# Patient Record
Sex: MALE | Race: WHITE | Employment: UNEMPLOYED | URBAN - METROPOLITAN AREA
[De-identification: names, ages, dates, MRNs, and addresses within clinical notes are randomized per-mention and may not be internally consistent; named-entity substitution may affect disease eponyms.]

---

## 2020-09-07 ENCOUNTER — HOSPITAL ENCOUNTER (EMERGENCY)
Age: 35
Discharge: LEFT BEFORE DISCHARGE | End: 2020-09-08
Attending: EMERGENCY MEDICINE | Admitting: INTERNAL MEDICINE
Payer: COMMERCIAL

## 2020-09-07 VITALS
WEIGHT: 205.4 LBS | OXYGEN SATURATION: 96 % | SYSTOLIC BLOOD PRESSURE: 145 MMHG | HEIGHT: 70 IN | DIASTOLIC BLOOD PRESSURE: 82 MMHG | HEART RATE: 75 BPM | TEMPERATURE: 98.3 F | BODY MASS INDEX: 29.41 KG/M2 | RESPIRATION RATE: 16 BRPM

## 2020-09-07 LAB
BASOPHILS ABSOLUTE: 0 K/UL (ref 0–0.2)
BASOPHILS RELATIVE PERCENT: 0.7 %
EOSINOPHILS ABSOLUTE: 0.6 K/UL (ref 0–0.6)
EOSINOPHILS RELATIVE PERCENT: 8.2 %
HCT VFR BLD CALC: 39.4 % (ref 40.5–52.5)
HEMOGLOBIN: 13.3 G/DL (ref 13.5–17.5)
LYMPHOCYTES ABSOLUTE: 3.2 K/UL (ref 1–5.1)
LYMPHOCYTES RELATIVE PERCENT: 44.2 %
MCH RBC QN AUTO: 28.8 PG (ref 26–34)
MCHC RBC AUTO-ENTMCNC: 33.7 G/DL (ref 31–36)
MCV RBC AUTO: 85.5 FL (ref 80–100)
MONOCYTES ABSOLUTE: 0.8 K/UL (ref 0–1.3)
MONOCYTES RELATIVE PERCENT: 10.5 %
NEUTROPHILS ABSOLUTE: 2.6 K/UL (ref 1.7–7.7)
NEUTROPHILS RELATIVE PERCENT: 36.4 %
PDW BLD-RTO: 14.4 % (ref 12.4–15.4)
PLATELET # BLD: 183 K/UL (ref 135–450)
PMV BLD AUTO: 8.5 FL (ref 5–10.5)
RBC # BLD: 4.61 M/UL (ref 4.2–5.9)
WBC # BLD: 7.3 K/UL (ref 4–11)

## 2020-09-07 PROCEDURE — G0480 DRUG TEST DEF 1-7 CLASSES: HCPCS

## 2020-09-07 PROCEDURE — 80053 COMPREHEN METABOLIC PANEL: CPT

## 2020-09-07 PROCEDURE — 82550 ASSAY OF CK (CPK): CPT

## 2020-09-07 PROCEDURE — 6370000000 HC RX 637 (ALT 250 FOR IP): Performed by: EMERGENCY MEDICINE

## 2020-09-07 PROCEDURE — 99284 EMERGENCY DEPT VISIT MOD MDM: CPT

## 2020-09-07 PROCEDURE — 85025 COMPLETE CBC W/AUTO DIFF WBC: CPT

## 2020-09-07 RX ORDER — BUPRENORPHINE AND NALOXONE 2; .5 MG/1; MG/1
1 FILM, SOLUBLE BUCCAL; SUBLINGUAL DAILY
COMMUNITY

## 2020-09-07 RX ORDER — DIAZEPAM 5 MG/1
5 TABLET ORAL ONCE
Status: COMPLETED | OUTPATIENT
Start: 2020-09-07 | End: 2020-09-07

## 2020-09-07 RX ORDER — BUPRENORPHINE HYDROCHLORIDE AND NALOXONE HYDROCHLORIDE DIHYDRATE 8; 2 MG/1; MG/1
1 TABLET SUBLINGUAL DAILY
COMMUNITY

## 2020-09-07 RX ADMIN — DIAZEPAM 5 MG: 5 TABLET ORAL at 23:35

## 2020-09-07 ASSESSMENT — PAIN DESCRIPTION - DESCRIPTORS: DESCRIPTORS: SPASM

## 2020-09-07 ASSESSMENT — PAIN DESCRIPTION - PAIN TYPE: TYPE: ACUTE PAIN

## 2020-09-07 ASSESSMENT — PAIN DESCRIPTION - LOCATION: LOCATION: ARM

## 2020-09-07 ASSESSMENT — PAIN SCALES - GENERAL: PAINLEVEL_OUTOF10: 10

## 2020-09-07 ASSESSMENT — PAIN DESCRIPTION - FREQUENCY: FREQUENCY: CONTINUOUS

## 2020-09-07 ASSESSMENT — PAIN DESCRIPTION - ORIENTATION: ORIENTATION: RIGHT;LEFT

## 2020-09-08 ENCOUNTER — HOSPITAL ENCOUNTER (EMERGENCY)
Age: 35
Discharge: HOME | End: 2020-09-08
Attending: EMERGENCY MEDICINE
Payer: COMMERCIAL

## 2020-09-08 VITALS
DIASTOLIC BLOOD PRESSURE: 138 MMHG | SYSTOLIC BLOOD PRESSURE: 163 MMHG | OXYGEN SATURATION: 100 % | WEIGHT: 205 LBS | BODY MASS INDEX: 29.41 KG/M2 | TEMPERATURE: 99 F | RESPIRATION RATE: 16 BRPM | HEART RATE: 82 BPM

## 2020-09-08 PROBLEM — M62.82 RHABDOMYOLYSIS: Status: ACTIVE | Noted: 2020-09-08

## 2020-09-08 LAB
A/G RATIO: 1.6 (ref 1.1–2.2)
ACETAMINOPHEN LEVEL: <5 UG/ML (ref 10–30)
ALBUMIN SERPL-MCNC: 4.4 G/DL (ref 3.4–5)
ALP BLD-CCNC: 74 U/L (ref 40–129)
ALT SERPL-CCNC: 38 U/L (ref 10–40)
AMPHETAMINE SCREEN, URINE: POSITIVE
ANION GAP SERPL CALCULATED.3IONS-SCNC: 11 MMOL/L (ref 3–16)
AST SERPL-CCNC: 46 U/L (ref 15–37)
BARBITURATE SCREEN URINE: ABNORMAL
BENZODIAZEPINE SCREEN, URINE: ABNORMAL
BILIRUB SERPL-MCNC: 0.3 MG/DL (ref 0–1)
BILIRUBIN URINE: NEGATIVE
BLOOD, URINE: NEGATIVE
BUN BLDV-MCNC: 18 MG/DL (ref 7–20)
CALCIUM SERPL-MCNC: 9.2 MG/DL (ref 8.3–10.6)
CANNABINOID SCREEN URINE: ABNORMAL
CHLORIDE BLD-SCNC: 104 MMOL/L (ref 99–110)
CLARITY: CLEAR
CO2: 25 MMOL/L (ref 21–32)
COCAINE METABOLITE SCREEN URINE: ABNORMAL
COLOR: YELLOW
CREAT SERPL-MCNC: 0.6 MG/DL (ref 0.9–1.3)
ETHANOL: NORMAL MG/DL (ref 0–0.08)
GFR AFRICAN AMERICAN: >60
GFR NON-AFRICAN AMERICAN: >60
GLOBULIN: 2.7 G/DL
GLUCOSE BLD-MCNC: 103 MG/DL (ref 70–99)
GLUCOSE URINE: NEGATIVE MG/DL
KETONES, URINE: NEGATIVE MG/DL
LEUKOCYTE ESTERASE, URINE: NEGATIVE
Lab: ABNORMAL
METHADONE SCREEN, URINE: ABNORMAL
MICROSCOPIC EXAMINATION: NORMAL
NITRITE, URINE: NEGATIVE
OPIATE SCREEN URINE: ABNORMAL
OXYCODONE URINE: ABNORMAL
PH UA: 6
PH UA: 6 (ref 5–8)
PHENCYCLIDINE SCREEN URINE: ABNORMAL
POTASSIUM REFLEX MAGNESIUM: 4 MMOL/L (ref 3.5–5.1)
PROPOXYPHENE SCREEN: ABNORMAL
PROTEIN UA: NEGATIVE MG/DL
SALICYLATE, SERUM: 1.2 MG/DL (ref 15–30)
SODIUM BLD-SCNC: 140 MMOL/L (ref 136–145)
SPECIFIC GRAVITY UA: >=1.03 (ref 1–1.03)
TOTAL CK: 1169 U/L (ref 39–308)
TOTAL PROTEIN: 7.1 G/DL (ref 6.4–8.2)
URINE REFLEX TO CULTURE: NORMAL
URINE TYPE: NORMAL
UROBILINOGEN, URINE: 0.2 E.U./DL

## 2020-09-08 PROCEDURE — 96374 THER/PROPH/DIAG INJ IV PUSH: CPT

## 2020-09-08 PROCEDURE — 81003 URINALYSIS AUTO W/O SCOPE: CPT

## 2020-09-08 PROCEDURE — 6360000002 HC RX W HCPCS: Performed by: EMERGENCY MEDICINE

## 2020-09-08 PROCEDURE — G0378 HOSPITAL OBSERVATION PER HR: HCPCS

## 2020-09-08 PROCEDURE — 80307 DRUG TEST PRSMV CHEM ANLYZR: CPT

## 2020-09-08 PROCEDURE — 99283 EMERGENCY DEPT VISIT LOW MDM: CPT

## 2020-09-08 PROCEDURE — 2580000003 HC RX 258: Performed by: EMERGENCY MEDICINE

## 2020-09-08 RX ORDER — 0.9 % SODIUM CHLORIDE 0.9 %
2000 INTRAVENOUS SOLUTION INTRAVENOUS ONCE
Status: COMPLETED | OUTPATIENT
Start: 2020-09-08 | End: 2020-09-08

## 2020-09-08 RX ORDER — LORAZEPAM 2 MG/ML
1 INJECTION INTRAMUSCULAR ONCE
Status: DISCONTINUED | OUTPATIENT
Start: 2020-09-08 | End: 2020-09-08

## 2020-09-08 RX ORDER — LORAZEPAM 2 MG/ML
2 INJECTION INTRAMUSCULAR ONCE
Status: COMPLETED | OUTPATIENT
Start: 2020-09-08 | End: 2020-09-08

## 2020-09-08 RX ADMIN — LORAZEPAM 2 MG: 2 INJECTION INTRAMUSCULAR; INTRAVENOUS at 01:40

## 2020-09-08 RX ADMIN — SODIUM CHLORIDE 2000 ML: 9 INJECTION, SOLUTION INTRAVENOUS at 00:51

## 2020-09-08 ASSESSMENT — ENCOUNTER SYMPTOMS
BLOOD IN STOOL: 0
TROUBLE SWALLOWING: 0
BACK PAIN: 0
COLOR CHANGE: 0
ABDOMINAL PAIN: 0
PHOTOPHOBIA: 0
FACIAL SWELLING: 0
STRIDOR: 0
SHORTNESS OF BREATH: 0
VOICE CHANGE: 0
WHEEZING: 0
VOMITING: 0
NAUSEA: 0

## 2020-09-08 ASSESSMENT — PAIN DESCRIPTION - DESCRIPTORS: DESCRIPTORS: ACHING;CONSTANT;TINGLING

## 2020-09-08 ASSESSMENT — PAIN DESCRIPTION - LOCATION: LOCATION: HAND

## 2020-09-08 ASSESSMENT — PAIN DESCRIPTION - PAIN TYPE: TYPE: ACUTE PAIN

## 2020-09-08 ASSESSMENT — PAIN SCALES - GENERAL: PAINLEVEL_OUTOF10: 10

## 2020-09-08 NOTE — ED NOTES
Pt ambulatory to lobby in stable condition, calling for ride home.      Alex Jon, PAMELA  09/08/20 4750

## 2020-09-08 NOTE — ED NOTES
.Patient wishes to leave against medical advise. Physician aware. Patient informed that they are leaving against the advice of the attending physician. Informed of risks by physician. No signs and symptoms distress noted or voiced. Patient refused to sign the AMA form.        Judge Madelyn RN  09/08/20 5274

## 2020-09-08 NOTE — ED PROVIDER NOTES
[address]          ATTENDING PHYSICIAN NOTE       Date of evaluation: 9/8/2020    Chief Complaint     Hand Pain      History of Present Illness     Margretta Osgood is a 38 y.o. male who presents with complaint of pain in his bilateral forearms and hands. He says is been present for possibly up to a week, and attributes it to lifting heavy things at work including steel and concrete. Patient initially presented to an outside hospital this evening where he appeared quite agitated, with concern for the same pain, and there was recommendation be admitted directly to the hospital service here for moderately elevated CK and concern for rhabdomyolysis, but left AMA because he says that he did not like what they are doing, and the outside hospital made his pain worse. He presents now with the same pain. There was reportedly some mention that he had expressed some suicidal thoughts. The patient adamantly denies this to myself, the nursing staff, and the  present. He localizes pain to the forearms and into the hands both sides, describes a pins and needle sensation. Review of Systems     Review of Systems  Unable to obtain due to patient's agitation and lack of cooperation with the interview    Past Medical, Surgical, Family, and Social History     He has a past medical history of Hepatitis C and Heroin use. He has no past surgical history on file. His family history is not on file. He reports that he has been smoking cigarettes. He has been smoking about 1.00 pack per day. He has quit using smokeless tobacco. He reports current drug use. Drug: IV. He reports that he does not drink alcohol. Medications     Previous Medications    BUPRENORPHINE-NALOXONE (SUBOXONE) 2-0.5 MG FILM SL FILM    Place 1 Film under the tongue daily. BUPRENORPHINE-NALOXONE (SUBOXONE) 8-2 MG SUBL SL TABLET    Place 1 tablet under the tongue daily.        Allergies     He has limiting examination of the patient reveals that he is moving his arms and legs without difficulty, though he describes significant pain. However, as I was discussing his reason for coming to the hospital, as well as potential goals of treatment and reasons that he might of been admitted to the hospital here, the patient became more agitated, saying that we were profiling him as a drug user, and subsequently stood up and walked out of the emergency department, would not wait for discharge instructions or for any further evaluation. He is alert and oriented x3, appears to understand the consequence of leaving without workup and has capacity to make that decision and actually described that to me during our conversation. He does appear somewhat agitated likely as result of ongoing drug use, but again in my judgment and those of the staff observed him, appears to have capacity to make the decision to leave without treatment, workup, or further exam.  He denies adamantly suicidal or homicidal ideation. We did tell him to come back should he change his mind and desire further evaluation. Clinical Impression     1. Pain in both upper extremities        Disposition     PATIENT REFERRED TO:  No follow-up provider specified.     DISCHARGE MEDICATIONS:  New Prescriptions    No medications on file       DISPOSITION [place] 09/08/2020 06:39:13 AM          Kush Jama MD  09/08/20 0166

## 2020-09-08 NOTE — ED PROVIDER NOTES
[address] [facility]  eMERGENCY dEPARTMENT eNCOUnter      Pt Name: Akilah Alonso  MRN: [id number]  [place] 1985  Date of evaluation: 9/7/2020  Provider: Oswaldo Nieto MD    [address] [place] [address]       Chief Complaint   Patient presents with    Spasms     today, woke up pain and spams in tonia lower arms from elbows to finger tips states started new job 3 days ago,          HISTORY OF PRESENT ILLNESS   (Location/Symptom, Timing/Onset, Context/Setting, Quality, Duration, Modifying Factors, Severity)  Note limiting factors. Akilah Alonso is a 38 y.o. male with hx of heroin abuse currently in remission and hepatitis C who presents with severe spasming pains in his bilateral upper extremities. He reports that the pain started suddenly when he woke up and is that his bilateral arms from his elbows down to his fingertips. He reports that the pain is constant, spasming, and equal bilaterally. Denies any fever or infectious symptoms. He denies any trauma numbness or weakness. He does report that he started a new job recently and does do increased lifting but denies any falls or trauma. He reports his symptoms are severe, constant, and worsening. HPI    Nursing Notes were reviewed. REVIEW OFSYSTEMS    (2-9 systems for level 4, 10 or more for level 5)     Review of Systems   Constitutional: Negative for appetite change, fever and unexpected weight change. HENT: Negative for facial swelling, trouble swallowing and voice change. Eyes: Negative for photophobia and visual disturbance. Respiratory: Negative for shortness of breath, wheezing and stridor. Cardiovascular: Negative for chest pain and palpitations. Gastrointestinal: Negative for abdominal pain, blood in stool, nausea and vomiting. Genitourinary: Negative for difficulty urinating and dysuria. Musculoskeletal: Positive for arthralgias and myalgias. Negative for back pain, gait problem and neck pain.    Skin: Negative for color change and wound. Neurological: Negative for seizures, syncope and speech difficulty. Psychiatric/Behavioral: Negative for self-injury and suicidal ideas. Except as noted above the remainder of the review of systems was reviewed and negative. PAST MEDICAL HISTORY     Past Medical History:   Diagnosis Date    Hepatitis C     Heroin use          SURGICAL HISTORY     No past surgical history on file. CURRENT MEDICATIONS       Previous Medications    BUPRENORPHINE-NALOXONE (SUBOXONE) 2-0.5 MG FILM SL FILM    Place 1 Film under the tongue daily. BUPRENORPHINE-NALOXONE (SUBOXONE) 8-2 MG SUBL SL TABLET    Place 1 tablet under the tongue daily. ALLERGIES     Patient has no known allergies. FAMILY HISTORY     No family history on file.        SOCIAL HISTORY       Social History     Socioeconomic History    Marital status: Single     Spouse name: Not on file    Number of children: Not on file    Years of education: Not on file    Highest education level: Not on file   Occupational History    Not on file   Social Needs    Financial resource strain: Not on file    Food insecurity     Worry: Not on file     Inability: Not on file    Transportation needs     Medical: Not on file     Non-medical: Not on file   Tobacco Use    Smoking status: Current Every Day Smoker     Packs/day: 1.00     Types: Cigarettes    Smokeless tobacco: Former User   Substance and Sexual Activity    Alcohol use: No    Drug use: Yes     Types: IV     Comment: heroin     Sexual activity: Not on file   Lifestyle    Physical activity     Days per week: Not on file     Minutes per session: Not on file    Stress: Not on file   Relationships    Social connections     Talks on phone: Not on file     Gets together: Not on file     Attends Scientologist service: Not on file     Active member of club or organization: Not on file     Attends meetings of clubs or organizations: Not on file     Relationship status: Not on file    Intimate partner violence     Fear of current or ex partner: Not on file     Emotionally abused: Not on file     Physically abused: Not on file     Forced sexual activity: Not on file   Other Topics Concern    Not on file   Social History Narrative    Not on file         PHYSICAL EXAM    (up to 7 for level 4, 8 or more for level 5)     ED Triage Vitals [09/07/20 2306]   BP Temp Temp Source Pulse Resp SpO2 Height Weight   (!) 145/82 98.3 °F (36.8 °C) Oral 75 16 96 % 5' 10\" (1.778 m) 205 lb 6.4 oz (93.2 kg)       Physical Exam  Vitals signs and nursing note reviewed. Constitutional:       General: He is not in acute distress. Appearance: He is well-developed. HENT:      Head: Normocephalic and atraumatic. Right Ear: External ear normal.      Left Ear: External ear normal.   Eyes:      Conjunctiva/sclera: Conjunctivae normal.   Neck:      Musculoskeletal: Neck supple. Vascular: No JVD. Trachea: No tracheal deviation. Cardiovascular:      Rate and Rhythm: Normal rate. Pulses: Normal pulses. Comments: 2+ radial and ulnar pulses equal bilaterally. Normal cap refill to all fingers of bilateral hands. Pulmonary:      Effort: Pulmonary effort is normal. No respiratory distress. Breath sounds: Normal breath sounds. No wheezing. Abdominal:      General: There is no distension. Palpations: Abdomen is soft. Tenderness: There is no abdominal tenderness. There is no guarding or rebound. Musculoskeletal: Normal range of motion. General: No tenderness. Skin:     General: Skin is warm and dry. Neurological:      General: No focal deficit present. Mental Status: He is alert and oriented to person, place, and time. Cranial Nerves: No cranial nerve deficit. Comments: Sensation intact in radial, median, ulnar nerve distribution equal bilaterally. 4-5  strength equal bilaterally somewhat limited due to pain.    Psychiatric:      Comments:   [address],  [place]   Phone [phone]   URINE RT REFLEX TO CULTURE    Narrative:     Performed at:  [facility]  Anil Pickett [address]   Phone [phone]   ETHANOL    Narrative:     Performed at:  [facility]  Anil Pickett [address]   Phone [phone]       All otherlabs were within normal range or not returned as of this dictation. EMERGENCY DEPARTMENT COURSE and DIFFERENTIAL DIAGNOSIS/MDM:   Vitals:    Vitals:    09/07/20 2306   BP: (!) 145/82   Pulse: 75   Resp: 16   Temp: 98.3 °F (36.8 °C)   TempSrc: Oral   SpO2: 96%   Weight: 205 lb 6.4 oz (93.2 kg)   Height: 5' 10\" (1.778 m)         MDM  Patient does appear to be on a sympathomimetic, is continuously moving, hyperactive, and does complain of diffuse muscle cramping pain. I do have concerns for rhabdomyolysis and therefore CK is obtained and is elevated. The patient is given 2 L of IV fluid. The patient does consent to a urine drug screen which comes back positive for amphetamines. He adamantly denies doing any Adderall, stimulants, methamphetamines, and reports that he does not know why his urine would be positive for amphetamines. I have strongly recommended admission for IV fluids, monitoring of kidney function, and further medical work-up to make sure that he does not have any cardiac, electrolyte, or renal imbalances from his rhabdomyolysis. The patient is accepted at [facility] for further medical management. Unfortunately the transfer center reports that they have contacted every EMS agency they are aware of and that it will be at least 7 hours before the patient will be able to be transferred. The patient does appear very agitated and with his permission he is given 5 mg of p.o. Ativan and later 2 mg of IV Ativan for his agitation however he reports medication is ineffective.   He reports that the only way that he will stay for transfer is that if I give him start of pain medicine to \"completely knock me out\". I told him that I am comfortable giving him additional medication for his agitation and pain however I feel that the risk of completely rendering him unconscious outweighs the benefit. The patient reports that he wants to leave [address] at this point in time. I have had in-depth conversations with him about the risks of doing this and feel that the patient is able to discuss risk, benefits, and alternatives of staying for admission and further evaluation. Furthermore the patient does become very agitated and threatens to rip his IV out and force his way out of the hospital if he is not discharged therefore I feel the risk of holding him against his will outweighs the benefit at this point in time as it is likely that he will pose an immediate physical threat to both himself as well as hospital staff. I did discuss the need for admission and the patient's condition with the patient's stepmother when she was in the emergency department in person however she is no longer here. I have asked the patient for his stepmother's phone number so that I may call her and discuss this with her however no one answers at the number that he provides. Patient is made aware that he can return the emergency room at any time if he changes his mind and that he should go to another hospital if he prefers however I have strongly recommended immediate medical evaluation given his rhabdomyolysis. He is advised to stay well-hydrated and avoid any drug use or medications that could be harmful to his kidneys. The patient as decided to leave against medical advice. The patient is awake and alert, non-intoxicated, non-suicidal, nonpsychotic. There is no medical condition that prevents or inhibits their understanding of the risks/benefits of their decision.  The patient understands the risks and benefits of their decision and has been given the opportunity to ask questions about their medical condition. Procedures    FINAL IMPRESSION      1. Non-traumatic rhabdomyolysis    2. Amphetamine abuse [facility])          DISPOSITION/PLAN   DISPOSITION [place] 09/08/2020 02:28:59 AM      PATIENT REFERRED TO:  [facility] [place]  [address] [facility] [address] [facility]    In 1 day  Ask for appointment with Primary Care Doctor    [facility]  [address] [place] [address]  [phone]  Today        (Please note that portions of this note were completed with a voice recognition program.  Efforts were made to edit the dictations but occasionally words aremis-transcribed. )    Radha Quach MD (electronically signed)  Attending Emergency Physician           Radha Quach MD  09/08/20 7546

## 2020-11-14 ENCOUNTER — HOSPITAL ENCOUNTER (EMERGENCY)
Age: 35
Discharge: HOME | End: 2020-11-14
Payer: COMMERCIAL

## 2020-11-14 ENCOUNTER — APPOINTMENT (OUTPATIENT)
Dept: GENERAL RADIOLOGY | Age: 35
End: 2020-11-14
Payer: COMMERCIAL

## 2020-11-14 VITALS
HEART RATE: 80 BPM | OXYGEN SATURATION: 100 % | WEIGHT: 212.3 LBS | TEMPERATURE: 98.1 F | RESPIRATION RATE: 16 BRPM | DIASTOLIC BLOOD PRESSURE: 74 MMHG | HEIGHT: 70 IN | SYSTOLIC BLOOD PRESSURE: 126 MMHG | BODY MASS INDEX: 30.39 KG/M2

## 2020-11-14 PROCEDURE — 73130 X-RAY EXAM OF HAND: CPT

## 2020-11-14 PROCEDURE — 90715 TDAP VACCINE 7 YRS/> IM: CPT | Performed by: NURSE PRACTITIONER

## 2020-11-14 PROCEDURE — 90471 IMMUNIZATION ADMIN: CPT | Performed by: NURSE PRACTITIONER

## 2020-11-14 PROCEDURE — 99283 EMERGENCY DEPT VISIT LOW MDM: CPT

## 2020-11-14 PROCEDURE — 2500000003 HC RX 250 WO HCPCS: Performed by: NURSE PRACTITIONER

## 2020-11-14 PROCEDURE — 6360000002 HC RX W HCPCS: Performed by: NURSE PRACTITIONER

## 2020-11-14 PROCEDURE — 96372 THER/PROPH/DIAG INJ SC/IM: CPT

## 2020-11-14 RX ORDER — CEPHALEXIN 500 MG/1
1000 CAPSULE ORAL 2 TIMES DAILY
Qty: 40 CAPSULE | Refills: 0 | Status: SHIPPED | OUTPATIENT
Start: 2020-11-14 | End: 2020-11-24

## 2020-11-14 RX ORDER — IBUPROFEN 800 MG/1
800 TABLET ORAL EVERY 8 HOURS PRN
Qty: 30 TABLET | Refills: 0 | Status: SHIPPED | OUTPATIENT
Start: 2020-11-14

## 2020-11-14 RX ORDER — CEFAZOLIN SODIUM 1 G/3ML
2 INJECTION, POWDER, FOR SOLUTION INTRAMUSCULAR; INTRAVENOUS ONCE
Status: COMPLETED | OUTPATIENT
Start: 2020-11-14 | End: 2020-11-14

## 2020-11-14 RX ORDER — LIDOCAINE HYDROCHLORIDE AND EPINEPHRINE BITARTRATE 20; .01 MG/ML; MG/ML
20 INJECTION, SOLUTION SUBCUTANEOUS ONCE
Status: COMPLETED | OUTPATIENT
Start: 2020-11-14 | End: 2020-11-14

## 2020-11-14 RX ADMIN — LIDOCAINE HYDROCHLORIDE AND EPINEPHRINE 20 ML: 20; 10 INJECTION, SOLUTION INFILTRATION; PERINEURAL at 19:37

## 2020-11-14 RX ADMIN — TETANUS TOXOID, REDUCED DIPHTHERIA TOXOID AND ACELLULAR PERTUSSIS VACCINE, ADSORBED 0.5 ML: 5; 2.5; 8; 8; 2.5 SUSPENSION INTRAMUSCULAR at 19:41

## 2020-11-14 RX ADMIN — CEFAZOLIN SODIUM 2 G: 1 INJECTION, POWDER, FOR SOLUTION INTRAMUSCULAR; INTRAVENOUS at 20:18

## 2020-11-14 ASSESSMENT — PAIN DESCRIPTION - ORIENTATION: ORIENTATION: LEFT

## 2020-11-14 ASSESSMENT — PAIN DESCRIPTION - DESCRIPTORS: DESCRIPTORS: DISCOMFORT

## 2020-11-14 ASSESSMENT — PAIN DESCRIPTION - PAIN TYPE: TYPE: ACUTE PAIN

## 2020-11-14 ASSESSMENT — PAIN SCALES - GENERAL
PAINLEVEL_OUTOF10: 8
PAINLEVEL_OUTOF10: 0
PAINLEVEL_OUTOF10: 9

## 2020-11-14 ASSESSMENT — PAIN DESCRIPTION - PROGRESSION: CLINICAL_PROGRESSION: GRADUALLY WORSENING

## 2020-11-14 ASSESSMENT — ENCOUNTER SYMPTOMS: COLOR CHANGE: 0

## 2020-11-14 ASSESSMENT — PAIN - FUNCTIONAL ASSESSMENT: PAIN_FUNCTIONAL_ASSESSMENT: PREVENTS OR INTERFERES WITH MANY ACTIVE NOT PASSIVE ACTIVITIES

## 2020-11-14 ASSESSMENT — PAIN DESCRIPTION - FREQUENCY: FREQUENCY: CONTINUOUS

## 2020-11-14 ASSESSMENT — PAIN DESCRIPTION - LOCATION: LOCATION: FINGER (COMMENT WHICH ONE)

## 2020-11-15 NOTE — ED PROVIDER NOTES
**ADVANCED PRACTICE PROVIDER, I HAVE EVALUATED THIS PATIENT**        [address]      Pt Name: Irma Rogers  [facility]:[id number]  [place] 1985  Date of evaluation: 11/14/2020  Provider: Jean Pierre Fountain, LUZ MARIA - ZACKARY      Chief Complaint:    Chief Complaint   Patient presents with    Hand Injury     left. staple in left 5th finger. unsure of tetanus. Nursing Notes, Past Medical Hx, Past Surgical Hx, Social Hx, Allergies, and Family Hx were all reviewed and agreed with or any disagreements were addressed in the HPI.    HPI:  (Location, Duration, Timing, Severity, Quality, Assoc Sx, Context, Modifying factors)  This is a  38 y.o. male who presents to the emergency department today complaining of a foreign body in the left pinky finger. Onset was just prior to arrival.  The context was he was working on a roof when a staple was shot through his finger. Pain rated 9/10. No weakness or numbness. Tetanus status unknown. Patient takes Suboxone for heroin addiction. PastMedical/Surgical History:      Diagnosis Date    Hepatitis C     Heroin use      History reviewed. No pertinent surgical history. Medications:  Discharge Medication List as of 11/14/2020  8:19 PM      CONTINUE these medications which have NOT CHANGED    Details   buprenorphine-naloxone (SUBOXONE) 8-2 MG SUBL SL tablet Place 1 tablet under the tongue daily. Historical Med      buprenorphine-naloxone (SUBOXONE) 2-0.5 MG FILM SL film Place 1 Film under the tongue daily. Historical Med               Review of Systems:  Review of Systems   Constitutional: Negative for diaphoresis. Musculoskeletal: Positive for arthralgias and joint swelling. Skin: Positive for wound. Negative for color change. Allergic/Immunologic: Negative for immunocompromised state. Neurological: Negative for weakness and numbness. Psychiatric/Behavioral: Negative for confusion.    All other systems reviewed and are negative. Positives and Pertinent negatives as per HPI. Except as noted above in the ROS, problem specific ROS was completed and is negative. Physical Exam:  Physical Exam  Vitals signs and nursing note reviewed. Constitutional:       General: He is not in acute distress. Appearance: Normal appearance. He is well-developed. He is not toxic-appearing. HENT:      Head: Normocephalic and atraumatic. Eyes:      General: No scleral icterus. Conjunctiva/sclera: Conjunctivae normal.   Neck:      Musculoskeletal: Normal range of motion. Vascular: No JVD. Cardiovascular:      Rate and Rhythm: Normal rate and regular rhythm. Pulses:           Radial pulses are 2+ on the left side. Pulmonary:      Effort: Pulmonary effort is normal. No respiratory distress. Abdominal:      Palpations: Abdomen is not rigid. Musculoskeletal:      Left hand: He exhibits decreased range of motion, tenderness, laceration (puncture wound) and swelling. He exhibits normal capillary refill and no deformity. Normal sensation noted. Hands:       Comments: Foreign body through the middle phalanx of the fifth digit left hand. Digit was anesthetized, and then foreign body was removed. Wound was copiously irrigated and vigorously scrubbed. Extensor and flexor tendons are intact. Distal extremity is pink and well perfused. He appears to have no neurovascular deficits. Splint was placed. He was given Ancef IM and discharged home with orthopedic hand referral  and Keflex. Skin:     General: Skin is warm and dry. Capillary Refill: Capillary refill takes less than 2 seconds. Neurological:      General: No focal deficit present. Mental Status: He is alert and oriented to person, place, and time. Cranial Nerves: Cranial nerves are intact. Sensory: Sensation is intact. Motor: Motor function is intact.    Psychiatric:         Mood and Affect: Mood normal. MEDICAL DECISION MAKING    Vitals:    Vitals:    11/14/20 1908 11/14/20 2030   BP: 131/80 126/74   Pulse: 86 80   Resp: 18 16   Temp: 98.1 °F (36.7 °C) 98.1 °F (36.7 °C)   TempSrc: Temporal Temporal   SpO2: 100% 100%   Weight: 212 lb 4.9 oz (96.3 kg)    Height: 5' 10\" (1.778 m)        LABS:Labs Reviewed - No data to display     Remainder of labs reviewed and werenegative at this time or not returned at the time of this note. RADIOLOGY:   Non-plain film images such as CT, Ultrasound and MRI are read by the radiologist. LUZ MARIA Savage CNP have directly visualized the radiologic plain film image(s) with the below findings:        Interpretation per the Radiologist below, if available at the time of this note:    XR HAND LEFT (MIN 3 VIEWS)   Final Result   Acute fracture of the 5th middle phalanx. Xr Hand Left (min 3 Views)    Result Date: 11/14/2020  EXAMINATION: THREE XRAY VIEWS OF THE LEFT HAND 11/14/2020 7:49 pm COMPARISON: None. HISTORY: ORDERING SYSTEM PROVIDED HISTORY: Stable through 5th TECHNOLOGIST PROVIDED HISTORY: Reason for exam:->Stable through 5th Reason for Exam: post staple removal left 5th digit FINDINGS: There is an impaction fracture of the 5th middle phalanx. No dislocation is identified. There is no radiopaque foreign body. Acute fracture of the 5th middle phalanx. MEDICAL DECISION MAKING / ED COURSE:      PROCEDURES:   Foreign Body    Date/Time: 11/14/2020 8:33 PM  Performed by: LUZ MARIA Castillo CNP  Authorized by: LUZ MARIA Castillo CNP     Consent:     Consent obtained:  Verbal    Consent given by:  Patient    Risks discussed:  Bleeding, incomplete removal, nerve damage, infection, pain and worsening of condition  Location:     Location:  Finger    Finger location:  L little finger    Depth: Underlying fracture.   Pre-procedure details:     Imaging:  X-ray    Neurovascular status: intact    Anesthesia (see MAR for exact dosages): Anesthesia method:  Local infiltration    Local anesthetic:  Lidocaine 2% WITH epi  Procedure type:     Procedure complexity:  Simple  Procedure details:     Removal mechanism: Pulled it out with fingers. Foreign bodies recovered:  1    Description:  Large staple    Intact foreign body removal: yes    Post-procedure details:     Neurovascular status: intact      Confirmation:  No additional foreign bodies on visualization    Skin closure:  None    Dressing:  Sterile dressing    Patient tolerance of procedure: Tolerated well, no immediate complications        Patient was given:  Medications   lidocaine-EPINEPHrine 2 percent-1:[id number] injection 20 mL (20 mLs Intradermal Given by Other 11/14/20 1937)   Tetanus-Diphth-Acell Pertussis (BOOSTRIX) injection 0.5 mL (0.5 mLs Intramuscular Given 11/14/20 1941)   ceFAZolin (ANCEF) injection 2 g (2 g Intramuscular Given 11/14/20 2018)       Differential diagnosis: includes but not limited to Arterial Injury/Ischemia, Fracture, Dislocation, Infection, Compartment Syndrome, Neurologic Deficit/Injury. Patient presents with foreign body in for digit left hand. See HPI for full presentation. Physical exam as above. 34-year-old lying in bed in no acute distress. Large staple through the middle phalanx fifth digit left hand. Digit was anesthetized, and then foreign body was removed. Wound was copiously irrigated and vigorously scrubbed. Extensor and flexor tendons are intact. Distal extremity is pink and well perfused. He appears to have no neurovascular deficits. Splint was placed. He was given Ancef IM and discharged home with orthopedic hand referral  and Keflex. Tetanus was updated. Patient was very upset that I was not prescribing narcotics but he already takes Suboxone. I did consult with the attending physician, Dr. Bob Linda, and he agreed that narcotics are not appropriate when patient already takes Suboxone.   At this time, the evidence for any other entities in the differential is insufficient to justify any further testing. This was explained to the patient. The patient was advised that persistent or worsening symptoms will require further evaluation. The patient tolerated their visit well. I evaluated the patient. The physician was available for consultation as needed. The patient and / or the family were informed of the results of any tests, a time was given to answer questions, a plan was proposed and they agreed with plan. CLINICAL IMPRESSION:  1. Open nondisplaced fracture of middle phalanx of left little finger, initial encounter    2.  Puncture wound of left little finger        DISPOSITION Decision To Discharge 11/14/2020 07:59:45 PM      PATIENT REFERRED TO:  Ti Luevano MD  [address] [address]  [phone]    Schedule an appointment as soon as possible for a visit       [address] [place] [address] Emergency Department  [address] [address]  [phone]  Go to   As needed      DISCHARGE MEDICATIONS:  Discharge Medication List as of 11/14/2020  8:19 PM      START taking these medications    Details   ibuprofen (ADVIL;MOTRIN) 800 MG tablet Take 1 tablet by mouth every 8 hours as needed for Pain, Disp-30 tablet,R-0Print      cephALEXin (KEFLEX) 500 MG capsule Take 2 capsules by mouth 2 times daily for 10 days, Disp-40 capsule,R-0Print             DISCONTINUED MEDICATIONS:  Discharge Medication List as of 11/14/2020  8:19 PM                 (Please note the MDM and HPI sections of this note were completed with a voice recognition program.  Efforts were made to edit the dictations but occasionally words are mis-transcribed.)    Electronically signed, LUZ MARIA Boyd CNP,           LUZ MARIA Boyd CNP  11/14/20 2036

## 2023-11-16 ENCOUNTER — HOSPITAL ENCOUNTER (EMERGENCY)
Age: 38
Discharge: HOME | End: 2023-11-16
Attending: STUDENT IN AN ORGANIZED HEALTH CARE EDUCATION/TRAINING PROGRAM
Payer: COMMERCIAL

## 2023-11-16 VITALS
BODY MASS INDEX: 29.64 KG/M2 | SYSTOLIC BLOOD PRESSURE: 133 MMHG | OXYGEN SATURATION: 97 % | DIASTOLIC BLOOD PRESSURE: 90 MMHG | TEMPERATURE: 97.7 F | RESPIRATION RATE: 16 BRPM | WEIGHT: 207.01 LBS | HEART RATE: 96 BPM | HEIGHT: 70 IN

## 2023-11-16 DIAGNOSIS — K02.9 DENTAL CARIES: ICD-10-CM

## 2023-11-16 DIAGNOSIS — K04.7 DENTAL INFECTION: ICD-10-CM

## 2023-11-16 DIAGNOSIS — K08.89 PAIN, DENTAL: Primary | ICD-10-CM

## 2023-11-16 PROCEDURE — 6370000000 HC RX 637 (ALT 250 FOR IP): Performed by: STUDENT IN AN ORGANIZED HEALTH CARE EDUCATION/TRAINING PROGRAM

## 2023-11-16 PROCEDURE — 64400 NJX AA&/STRD TRIGEMINAL NRV: CPT

## 2023-11-16 PROCEDURE — 99283 EMERGENCY DEPT VISIT LOW MDM: CPT

## 2023-11-16 RX ORDER — CHLORHEXIDINE GLUCONATE ORAL RINSE 1.2 MG/ML
15 SOLUTION DENTAL 2 TIMES DAILY
Qty: 420 ML | Refills: 0 | Status: SHIPPED | OUTPATIENT
Start: 2023-11-16 | End: 2023-11-30

## 2023-11-16 RX ORDER — METRONIDAZOLE 500 MG/1
500 TABLET ORAL 2 TIMES DAILY
Qty: 14 TABLET | Refills: 0 | Status: SHIPPED | OUTPATIENT
Start: 2023-11-16 | End: 2023-11-23

## 2023-11-16 RX ORDER — IBUPROFEN 600 MG/1
600 TABLET ORAL ONCE
Status: COMPLETED | OUTPATIENT
Start: 2023-11-16 | End: 2023-11-16

## 2023-11-16 RX ORDER — METRONIDAZOLE 500 MG/1
500 TABLET ORAL ONCE
Status: COMPLETED | OUTPATIENT
Start: 2023-11-16 | End: 2023-11-16

## 2023-11-16 RX ORDER — IBUPROFEN 600 MG/1
600 TABLET ORAL EVERY 6 HOURS PRN
Qty: 120 TABLET | Refills: 0 | Status: SHIPPED | OUTPATIENT
Start: 2023-11-16

## 2023-11-16 RX ORDER — PENICILLIN V POTASSIUM 500 MG/1
500 TABLET ORAL ONCE
Status: COMPLETED | OUTPATIENT
Start: 2023-11-16 | End: 2023-11-16

## 2023-11-16 RX ORDER — LIDOCAINE HYDROCHLORIDE 20 MG/ML
15 SOLUTION OROPHARYNGEAL PRN
Qty: 1 EACH | Refills: 0 | Status: SHIPPED | OUTPATIENT
Start: 2023-11-16

## 2023-11-16 RX ORDER — PENICILLIN V POTASSIUM 500 MG/1
500 TABLET ORAL 4 TIMES DAILY
Qty: 40 TABLET | Refills: 0 | Status: SHIPPED | OUTPATIENT
Start: 2023-11-16 | End: 2023-11-26

## 2023-11-16 RX ADMIN — PENICILLIN V POTASSIUM 500 MG: 500 TABLET, FILM COATED ORAL at 13:21

## 2023-11-16 RX ADMIN — IBUPROFEN 600 MG: 600 TABLET, FILM COATED ORAL at 13:21

## 2023-11-16 RX ADMIN — METRONIDAZOLE 500 MG: 500 TABLET ORAL at 13:25

## 2023-11-16 ASSESSMENT — PAIN - FUNCTIONAL ASSESSMENT
PAIN_FUNCTIONAL_ASSESSMENT: 0-10

## 2023-11-16 ASSESSMENT — PAIN SCALES - GENERAL
PAINLEVEL_OUTOF10: 10

## 2023-11-16 ASSESSMENT — PAIN DESCRIPTION - PAIN TYPE: TYPE: ACUTE PAIN

## 2023-11-16 ASSESSMENT — PAIN DESCRIPTION - LOCATION: LOCATION: TEETH

## 2023-11-16 ASSESSMENT — PAIN DESCRIPTION - ORIENTATION: ORIENTATION: RIGHT;UPPER

## 2023-11-16 NOTE — DISCHARGE INSTRUCTIONS
Call the dentist referral list given to you. Take your medications as prescribed you may also take Tylenol in addition to those help further control your pain.   Return if you develop any new or worsening symptoms